# Patient Record
Sex: FEMALE | Race: WHITE | NOT HISPANIC OR LATINO | Employment: FULL TIME | URBAN - METROPOLITAN AREA
[De-identification: names, ages, dates, MRNs, and addresses within clinical notes are randomized per-mention and may not be internally consistent; named-entity substitution may affect disease eponyms.]

---

## 2023-12-30 ENCOUNTER — OFFICE VISIT (OUTPATIENT)
Dept: URGENT CARE | Facility: CLINIC | Age: 38
End: 2023-12-30
Payer: COMMERCIAL

## 2023-12-30 VITALS
OXYGEN SATURATION: 97 % | HEIGHT: 70 IN | TEMPERATURE: 99 F | SYSTOLIC BLOOD PRESSURE: 129 MMHG | RESPIRATION RATE: 16 BRPM | BODY MASS INDEX: 25.48 KG/M2 | WEIGHT: 178 LBS | HEART RATE: 84 BPM | DIASTOLIC BLOOD PRESSURE: 86 MMHG

## 2023-12-30 DIAGNOSIS — S61.012A THUMB LACERATION, LEFT, INITIAL ENCOUNTER: Primary | ICD-10-CM

## 2023-12-30 PROCEDURE — 99203 OFFICE O/P NEW LOW 30 MIN: CPT | Mod: ,,, | Performed by: FAMILY MEDICINE

## 2023-12-30 PROCEDURE — 99203 PR OFFICE/OUTPT VISIT, NEW, LEVL III, 30-44 MIN: ICD-10-PCS | Mod: ,,, | Performed by: FAMILY MEDICINE

## 2023-12-30 NOTE — PROGRESS NOTES
"Subjective:      Patient ID: Brittni Lazaro is a 38 y.o. female.    Vitals:  height is 5' 10" (1.778 m) and weight is 80.7 kg (178 lb). Her temperature is 98.6 °F (37 °C). Her blood pressure is 129/86 and her pulse is 84. Her respiration is 16 and oxygen saturation is 97%.     Chief Complaint: Laceration (Laceration on left hand near thumb. Pt cut herself with a  today. )    Today cut the base of her thumb with a . Had tetanus booster in the last few years.  No numbness or tingling. Bleeding controlled.  Area cleaned today.         Constitution: Negative for sweating, fatigue and fever.   Skin:  Positive for laceration.   Neurological:  Negative for dizziness.      Objective:     Physical Exam   Cardiovascular: Normal rate.   Pulmonary/Chest: Effort normal.   Abdominal: Normal appearance.   Neurological: no focal deficit. She is alert.   Skin:         Comments: 1cm linear laceration to the base of the L thumb without FB.  The lateral edge moves slightly on exam.     Psychiatric: Mood normal.   Nursing note and vitals reviewed.      Assessment:     1. Thumb laceration, left, initial encounter        Plan:       Thumb laceration, left, initial encounter           Discussed options.  Pt opted to steri strip and cover.           "